# Patient Record
Sex: FEMALE | Race: WHITE | Employment: UNEMPLOYED | URBAN - METROPOLITAN AREA
[De-identification: names, ages, dates, MRNs, and addresses within clinical notes are randomized per-mention and may not be internally consistent; named-entity substitution may affect disease eponyms.]

---

## 2019-10-25 ENCOUNTER — APPOINTMENT (OUTPATIENT)
Dept: LAB | Age: 34
End: 2019-10-25
Attending: PHYSICIAN ASSISTANT
Payer: COMMERCIAL

## 2019-10-25 ENCOUNTER — LAB ENCOUNTER (OUTPATIENT)
Dept: LAB | Age: 34
End: 2019-10-25
Attending: PHYSICIAN ASSISTANT
Payer: COMMERCIAL

## 2019-10-25 DIAGNOSIS — Z13.0 SCREENING FOR DEFICIENCY ANEMIA: ICD-10-CM

## 2019-10-25 DIAGNOSIS — Z13.29 SCREENING FOR THYROID DISORDER: ICD-10-CM

## 2019-10-25 DIAGNOSIS — Z13.220 SCREENING FOR LIPID DISORDERS: ICD-10-CM

## 2019-10-25 DIAGNOSIS — F31.4 BIPOLAR DISORDER, CURRENT EPISODE DEPRESSED, SEVERE, WITHOUT PSYCHOTIC FEATURES (HCC): ICD-10-CM

## 2019-10-25 DIAGNOSIS — Z13.1 SCREENING FOR DIABETES MELLITUS: ICD-10-CM

## 2019-10-25 DIAGNOSIS — F41.1 GENERALIZED ANXIETY DISORDER: ICD-10-CM

## 2019-10-25 DIAGNOSIS — Z13.21 ENCOUNTER FOR VITAMIN DEFICIENCY SCREENING: ICD-10-CM

## 2019-10-25 DIAGNOSIS — F20.9 SCHIZOPHRENIA, UNSPECIFIED TYPE (HCC): ICD-10-CM

## 2019-10-25 DIAGNOSIS — Z79.899 ENCOUNTER FOR LONG-TERM (CURRENT) USE OF MEDICATIONS: ICD-10-CM

## 2019-10-25 PROCEDURE — 84146 ASSAY OF PROLACTIN: CPT

## 2019-10-25 PROCEDURE — 83036 HEMOGLOBIN GLYCOSYLATED A1C: CPT

## 2019-10-25 PROCEDURE — 93005 ELECTROCARDIOGRAM TRACING: CPT

## 2019-10-25 PROCEDURE — 93010 ELECTROCARDIOGRAM REPORT: CPT | Performed by: INTERNAL MEDICINE

## 2019-10-25 PROCEDURE — 82306 VITAMIN D 25 HYDROXY: CPT

## 2019-10-25 PROCEDURE — 84443 ASSAY THYROID STIM HORMONE: CPT

## 2019-10-25 PROCEDURE — 84439 ASSAY OF FREE THYROXINE: CPT

## 2019-10-25 PROCEDURE — 85025 COMPLETE CBC W/AUTO DIFF WBC: CPT

## 2019-10-25 PROCEDURE — 80061 LIPID PANEL: CPT

## 2019-10-25 PROCEDURE — 80053 COMPREHEN METABOLIC PANEL: CPT

## 2019-10-28 PROBLEM — E78.5 DYSLIPIDEMIA: Status: ACTIVE | Noted: 2019-10-28

## 2019-10-28 PROBLEM — R79.89 ABNORMAL LIVER FUNCTION TEST: Status: ACTIVE | Noted: 2019-10-28

## 2019-10-28 PROBLEM — R79.89 LOW THYROXINE (T4) LEVEL: Status: ACTIVE | Noted: 2019-10-28

## 2019-10-28 PROBLEM — R94.31 ABNORMAL ELECTROCARDIOGRAM (ECG) (EKG): Status: ACTIVE | Noted: 2019-10-28

## 2019-10-28 PROBLEM — E22.1 HYPERPROLACTINEMIA (HCC): Status: ACTIVE | Noted: 2019-10-28

## 2019-10-28 PROBLEM — R79.89 LOW VITAMIN D LEVEL: Status: ACTIVE | Noted: 2019-10-28

## 2019-10-28 PROBLEM — R73.09 ELEVATED HEMOGLOBIN A1C: Status: ACTIVE | Noted: 2019-10-28

## 2019-11-11 ENCOUNTER — TELEPHONE (OUTPATIENT)
Dept: FAMILY MEDICINE CLINIC | Facility: CLINIC | Age: 34
End: 2019-11-11

## 2019-11-12 ENCOUNTER — OFFICE VISIT (OUTPATIENT)
Dept: FAMILY MEDICINE CLINIC | Facility: CLINIC | Age: 34
End: 2019-11-12
Payer: COMMERCIAL

## 2019-11-12 VITALS
HEART RATE: 84 BPM | DIASTOLIC BLOOD PRESSURE: 80 MMHG | TEMPERATURE: 98 F | HEIGHT: 68.5 IN | WEIGHT: 226 LBS | RESPIRATION RATE: 16 BRPM | BODY MASS INDEX: 33.86 KG/M2 | SYSTOLIC BLOOD PRESSURE: 116 MMHG

## 2019-11-12 DIAGNOSIS — R73.03 PREDIABETES: ICD-10-CM

## 2019-11-12 DIAGNOSIS — F31.70 BIPOLAR DISORDER IN PARTIAL REMISSION, MOST RECENT EPISODE UNSPECIFIED TYPE (HCC): ICD-10-CM

## 2019-11-12 DIAGNOSIS — Z11.51 SCREENING FOR HUMAN PAPILLOMAVIRUS (HPV): ICD-10-CM

## 2019-11-12 DIAGNOSIS — B96.89 BACTERIAL VAGINOSIS: ICD-10-CM

## 2019-11-12 DIAGNOSIS — F20.9 SCHIZOPHRENIA, UNSPECIFIED TYPE (HCC): ICD-10-CM

## 2019-11-12 DIAGNOSIS — N76.0 BACTERIAL VAGINOSIS: ICD-10-CM

## 2019-11-12 DIAGNOSIS — E55.9 VITAMIN D DEFICIENCY: ICD-10-CM

## 2019-11-12 DIAGNOSIS — E78.2 MIXED HYPERLIPIDEMIA: ICD-10-CM

## 2019-11-12 DIAGNOSIS — Z00.00 WELL ADULT EXAM: Primary | ICD-10-CM

## 2019-11-12 PROCEDURE — 88175 CYTOPATH C/V AUTO FLUID REDO: CPT | Performed by: PHYSICIAN ASSISTANT

## 2019-11-12 PROCEDURE — 87624 HPV HI-RISK TYP POOLED RSLT: CPT | Performed by: PHYSICIAN ASSISTANT

## 2019-11-12 PROCEDURE — 99385 PREV VISIT NEW AGE 18-39: CPT | Performed by: PHYSICIAN ASSISTANT

## 2019-11-12 RX ORDER — METRONIDAZOLE 500 MG/1
500 TABLET ORAL 2 TIMES DAILY
Qty: 14 TABLET | Refills: 0 | Status: SHIPPED | OUTPATIENT
Start: 2019-11-12 | End: 2019-11-19

## 2019-11-14 NOTE — PROGRESS NOTES
DATE OF EXAM  11/12/2019    CHIEF COMPLAINT/REASON FOR VISIT  Annual exam.    HISTORY OF PRESENT ILLNESS  Mandi Baker presents today for routine annual physical examination. She is looking to re-establish her.  She was living in [address] [place] [address] for a number of yea Smoking status: Former Smoker        Packs/day: 0.00        Types: Cigarettes      Smokeless tobacco: Never Used      Tobacco comment: Quit 8/2018    Substance and Sexual Activity      Alcohol use: Yes        Comment: OCC      Drug use: No    Other Topics recent headaches, falling, on numbness, tingling, weakness. PHYSICAL EXAMINATION  Blood pressure 116/80, pulse 84, temperature 97.9 °F (36.6 °C), temperature source Oral, resp.  rate 16, height 68.5\", weight 226 lb (102.5 kg), last menstrual period 10 including: regular exercise, balanced diet, sunscreen, monitoring moles, adequate calcium and vitamin D intake. Health maintenance is up to date as shown above. Recommend complete physical exams every year.     2. Prediabetes  Discussed A1c consistent with

## 2019-11-19 PROBLEM — F31.9 BIPOLAR DISORDER (HCC): Status: ACTIVE | Noted: 2019-11-19

## 2019-11-19 PROBLEM — F20.9 SCHIZOPHRENIA (HCC): Status: ACTIVE | Noted: 2019-11-19

## 2019-12-06 PROBLEM — R53.83 TIREDNESS: Status: ACTIVE | Noted: 2019-12-06

## 2019-12-06 PROBLEM — R53.83 FATIGUE: Status: ACTIVE | Noted: 2019-12-06

## 2019-12-09 ENCOUNTER — TELEPHONE (OUTPATIENT)
Dept: FAMILY MEDICINE CLINIC | Facility: CLINIC | Age: 34
End: 2019-12-09

## 2020-06-15 PROBLEM — N91.2 AMENORRHEA: Status: ACTIVE | Noted: 2020-06-15

## 2020-12-08 PROBLEM — N91.2 AMENORRHEA: Status: RESOLVED | Noted: 2020-06-15 | Resolved: 2020-12-08

## 2021-03-01 PROBLEM — F20.9 SCHIZOPHRENIA (HCC): Chronic | Status: ACTIVE | Noted: 2019-11-19

## 2021-03-01 PROBLEM — F31.9 BIPOLAR DISORDER (HCC): Chronic | Status: ACTIVE | Noted: 2019-11-19

## 2021-05-12 ENCOUNTER — TELEPHONE (OUTPATIENT)
Dept: FAMILY MEDICINE CLINIC | Facility: CLINIC | Age: 36
End: 2021-05-12

## 2021-05-12 NOTE — TELEPHONE ENCOUNTER
Please enter lab orders for the patient's upcoming physical appointment. Physical scheduled:    Your appointments     Date & Time Appointment Department [facility])    May 13, 2021  9:30 AM CDT Adult Physical with CORRIE Childers [address] [facility], [facility]

## 2021-05-13 ENCOUNTER — OFFICE VISIT (OUTPATIENT)
Dept: FAMILY MEDICINE CLINIC | Facility: CLINIC | Age: 36
End: 2021-05-13
Payer: COMMERCIAL

## 2021-05-13 VITALS
TEMPERATURE: 98 F | HEART RATE: 62 BPM | SYSTOLIC BLOOD PRESSURE: 118 MMHG | BODY MASS INDEX: 28.26 KG/M2 | RESPIRATION RATE: 16 BRPM | OXYGEN SATURATION: 98 % | DIASTOLIC BLOOD PRESSURE: 70 MMHG | HEIGHT: 69 IN | WEIGHT: 190.81 LBS

## 2021-05-13 DIAGNOSIS — F20.9 SCHIZOPHRENIA, UNSPECIFIED TYPE (HCC): ICD-10-CM

## 2021-05-13 DIAGNOSIS — Z00.00 ANNUAL PHYSICAL EXAM: Primary | ICD-10-CM

## 2021-05-13 DIAGNOSIS — Z00.00 LABORATORY EXAMINATION ORDERED AS PART OF A COMPLETE PHYSICAL EXAMINATION: ICD-10-CM

## 2021-05-13 DIAGNOSIS — F31.70 BIPOLAR DISORDER IN PARTIAL REMISSION, MOST RECENT EPISODE UNSPECIFIED TYPE (HCC): ICD-10-CM

## 2021-05-13 PROCEDURE — 3074F SYST BP LT 130 MM HG: CPT | Performed by: PHYSICIAN ASSISTANT

## 2021-05-13 PROCEDURE — 99395 PREV VISIT EST AGE 18-39: CPT | Performed by: PHYSICIAN ASSISTANT

## 2021-05-13 PROCEDURE — 81003 URINALYSIS AUTO W/O SCOPE: CPT | Performed by: PHYSICIAN ASSISTANT

## 2021-05-13 PROCEDURE — 3008F BODY MASS INDEX DOCD: CPT | Performed by: PHYSICIAN ASSISTANT

## 2021-05-13 PROCEDURE — 3078F DIAST BP <80 MM HG: CPT | Performed by: PHYSICIAN ASSISTANT

## 2021-05-16 NOTE — PROGRESS NOTES
DATE OF EXAM  5/13/2021    CHIEF COMPLAINT/REASON FOR VISIT  Annual exam.    HISTORY OF PRESENT ILLNESS  Nat Nose Hynds presents today for routine annual physical examination.      - Due for repeat labs.  Hx of dyslipidemia, abnormal thyroid, low vitamin Vaping Use: Never used    Substance and Sexual Activity      Alcohol use: Yes        Comment: OCC      Drug use: Yes        Types: Cannabis    Other Topics      Concerns:        Caffeine Concern: Yes          tea once a week        Exercise: Yes          d discharge, or pain. PSYCH: No changing anxiety or depression. No suicidal ideation. SKIN: No rashes or lesions. NEURO:  No recent headaches, falling, on numbness, tingling, weakness.       PHYSICAL EXAMINATION  Blood pressure 118/70, pulse 62, temperatur year.    2. Laboratory examination ordered as part of a complete physical examination  Labs ordered. Negative UA in clinic.   - COMP METABOLIC PANEL (14); Future  - LIPID PANEL; Future  - CBC WITH DIFFERENTIAL WITH PLATELET;  Future  - TSH W REFLEX TO FREE

## 2021-06-09 ENCOUNTER — LAB ENCOUNTER (OUTPATIENT)
Dept: LAB | Age: 36
End: 2021-06-09
Attending: PHYSICIAN ASSISTANT
Payer: COMMERCIAL

## 2021-06-09 DIAGNOSIS — Z79.899 ENCOUNTER FOR LONG-TERM CURRENT USE OF MEDICATION: ICD-10-CM

## 2021-06-09 DIAGNOSIS — E22.1 HYPERPROLACTINEMIA (HCC): ICD-10-CM

## 2021-06-09 DIAGNOSIS — Z00.00 LABORATORY EXAMINATION ORDERED AS PART OF A COMPLETE PHYSICAL EXAMINATION: ICD-10-CM

## 2021-06-09 PROBLEM — R79.89 ABNORMAL LIVER FUNCTION TEST: Status: RESOLVED | Noted: 2019-10-28 | Resolved: 2021-06-09

## 2021-06-09 PROCEDURE — 82306 VITAMIN D 25 HYDROXY: CPT | Performed by: PHYSICIAN ASSISTANT

## 2021-06-09 PROCEDURE — 80061 LIPID PANEL: CPT | Performed by: PHYSICIAN ASSISTANT

## 2021-06-09 PROCEDURE — 80050 GENERAL HEALTH PANEL: CPT | Performed by: PHYSICIAN ASSISTANT

## 2021-06-25 ENCOUNTER — LAB ENCOUNTER (OUTPATIENT)
Dept: LAB | Age: 36
End: 2021-06-25
Attending: PHYSICIAN ASSISTANT
Payer: COMMERCIAL

## 2021-06-25 ENCOUNTER — OFFICE VISIT (OUTPATIENT)
Dept: FAMILY MEDICINE CLINIC | Facility: CLINIC | Age: 36
End: 2021-06-25
Payer: COMMERCIAL

## 2021-06-25 VITALS
BODY MASS INDEX: 27.33 KG/M2 | DIASTOLIC BLOOD PRESSURE: 80 MMHG | RESPIRATION RATE: 16 BRPM | TEMPERATURE: 98 F | WEIGHT: 184.5 LBS | HEIGHT: 69 IN | SYSTOLIC BLOOD PRESSURE: 120 MMHG | HEART RATE: 60 BPM

## 2021-06-25 DIAGNOSIS — Z11.3 SCREEN FOR STD (SEXUALLY TRANSMITTED DISEASE): Primary | ICD-10-CM

## 2021-06-25 PROCEDURE — 3079F DIAST BP 80-89 MM HG: CPT | Performed by: PHYSICIAN ASSISTANT

## 2021-06-25 PROCEDURE — 87389 HIV-1 AG W/HIV-1&-2 AB AG IA: CPT | Performed by: PHYSICIAN ASSISTANT

## 2021-06-25 PROCEDURE — 87591 N.GONORRHOEAE DNA AMP PROB: CPT | Performed by: PHYSICIAN ASSISTANT

## 2021-06-25 PROCEDURE — 86780 TREPONEMA PALLIDUM: CPT | Performed by: PHYSICIAN ASSISTANT

## 2021-06-25 PROCEDURE — 3074F SYST BP LT 130 MM HG: CPT | Performed by: PHYSICIAN ASSISTANT

## 2021-06-25 PROCEDURE — 3008F BODY MASS INDEX DOCD: CPT | Performed by: PHYSICIAN ASSISTANT

## 2021-06-25 PROCEDURE — 87340 HEPATITIS B SURFACE AG IA: CPT | Performed by: PHYSICIAN ASSISTANT

## 2021-06-25 PROCEDURE — 99213 OFFICE O/P EST LOW 20 MIN: CPT | Performed by: PHYSICIAN ASSISTANT

## 2021-06-25 PROCEDURE — 87491 CHLMYD TRACH DNA AMP PROBE: CPT | Performed by: PHYSICIAN ASSISTANT

## 2021-06-25 PROCEDURE — 86803 HEPATITIS C AB TEST: CPT | Performed by: PHYSICIAN ASSISTANT

## 2021-06-29 NOTE — PROGRESS NOTES
Patient presents with:  Sexually Transmitted: would like sti testing done       HISTORY OF PRESENT ILLNESS  Claude Parsley is a 31year old female who presents for STD testing. Her boyfriend and her just become exclusive.  He stated he had some urinary co dressed. CARDIOVASCULAR: Regular rate and rhythm. PULMONOLOGY: Normal effort on room air. Clear to auscultation bilaterally.     ASSESSMENT/ PLAN  (Z11.3) Screen for STD (sexually transmitted disease)  (primary encounter diagnosis)  Plan: STD check ordere

## 2021-10-21 ENCOUNTER — OFFICE VISIT (OUTPATIENT)
Dept: FAMILY MEDICINE CLINIC | Facility: CLINIC | Age: 36
End: 2021-10-21
Payer: COMMERCIAL

## 2021-10-21 VITALS
BODY MASS INDEX: 25.63 KG/M2 | TEMPERATURE: 98 F | WEIGHT: 177 LBS | SYSTOLIC BLOOD PRESSURE: 110 MMHG | OXYGEN SATURATION: 99 % | HEART RATE: 80 BPM | HEIGHT: 69.5 IN | DIASTOLIC BLOOD PRESSURE: 70 MMHG | RESPIRATION RATE: 16 BRPM

## 2021-10-21 DIAGNOSIS — N89.8 VAGINAL DISCHARGE: Primary | ICD-10-CM

## 2021-10-21 DIAGNOSIS — K13.0 LIP DISEASE: ICD-10-CM

## 2021-10-21 PROCEDURE — 99213 OFFICE O/P EST LOW 20 MIN: CPT | Performed by: PHYSICIAN ASSISTANT

## 2021-10-21 PROCEDURE — 87660 TRICHOMONAS VAGIN DIR PROBE: CPT | Performed by: PHYSICIAN ASSISTANT

## 2021-10-21 PROCEDURE — 3008F BODY MASS INDEX DOCD: CPT | Performed by: PHYSICIAN ASSISTANT

## 2021-10-21 PROCEDURE — 87480 CANDIDA DNA DIR PROBE: CPT | Performed by: PHYSICIAN ASSISTANT

## 2021-10-21 PROCEDURE — 3078F DIAST BP <80 MM HG: CPT | Performed by: PHYSICIAN ASSISTANT

## 2021-10-21 PROCEDURE — 3074F SYST BP LT 130 MM HG: CPT | Performed by: PHYSICIAN ASSISTANT

## 2021-10-21 PROCEDURE — 87510 GARDNER VAG DNA DIR PROBE: CPT | Performed by: PHYSICIAN ASSISTANT

## 2021-10-25 NOTE — PROGRESS NOTES
Patient presents with:  Medical Question       HISTORY OF PRESENT ILLNESS  Chris Mcmillan is a 31year old female who presents for personal concern. Notes that she found out the erich she was seeing was  (wife ended up contacting her).  Estephania Matthews was 0.00        Types: Cigarettes      Smokeless tobacco: Never Used      Tobacco comment: Quit 8/2018    Vaping Use      Vaping Use: Never used    Alcohol use: Yes      Comment: OCC    Drug use: Yes      Types: Cannabis         10/21/21  1101   BP: 110/70   P

## 2022-02-09 ENCOUNTER — OFFICE VISIT (OUTPATIENT)
Dept: FAMILY MEDICINE CLINIC | Facility: CLINIC | Age: 37
End: 2022-02-09
Payer: COMMERCIAL

## 2022-02-09 VITALS
TEMPERATURE: 97 F | HEART RATE: 73 BPM | WEIGHT: 180 LBS | OXYGEN SATURATION: 99 % | HEIGHT: 69.5 IN | SYSTOLIC BLOOD PRESSURE: 114 MMHG | DIASTOLIC BLOOD PRESSURE: 60 MMHG | RESPIRATION RATE: 16 BRPM | BODY MASS INDEX: 26.06 KG/M2
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
## 2022-02-09 DIAGNOSIS — U07.1 COVID-19: ICD-10-CM

## 2022-02-09 DIAGNOSIS — S06.0X0D CONCUSSION WITHOUT LOSS OF CONSCIOUSNESS, SUBSEQUENT ENCOUNTER: Primary | ICD-10-CM

## 2022-02-09 PROCEDURE — 3074F SYST BP LT 130 MM HG: CPT | Performed by: PHYSICIAN ASSISTANT

## 2022-02-09 PROCEDURE — 3008F BODY MASS INDEX DOCD: CPT | Performed by: PHYSICIAN ASSISTANT

## 2022-02-09 PROCEDURE — 99214 OFFICE O/P EST MOD 30 MIN: CPT | Performed by: PHYSICIAN ASSISTANT

## 2022-02-09 PROCEDURE — 3078F DIAST BP <80 MM HG: CPT | Performed by: PHYSICIAN ASSISTANT
